# Patient Record
Sex: MALE | Race: OTHER
[De-identification: names, ages, dates, MRNs, and addresses within clinical notes are randomized per-mention and may not be internally consistent; named-entity substitution may affect disease eponyms.]

---

## 2019-08-18 ENCOUNTER — HOSPITAL ENCOUNTER (EMERGENCY)
Dept: HOSPITAL 56 - MW.ED | Age: 24
Discharge: HOME | End: 2019-08-18
Payer: SELF-PAY

## 2019-08-18 DIAGNOSIS — F17.210: ICD-10-CM

## 2019-08-18 DIAGNOSIS — Z02.89: Primary | ICD-10-CM

## 2019-08-18 NOTE — EDM.PDOC
ED HPI GENERAL MEDICAL PROBLEM





- General


Chief Complaint: General


Stated Complaint: MEDICAL CLEARANCE


Time Seen by Provider: 08/18/19 01:58





- History of Present Illness


INITIAL COMMENTS - FREE TEXT/NARRATIVE: 


HISTORY AND PHYSICAL:





History of present illness:


Patient is a 23-year-old male presents in custody of law enforcement for 

medical clearance patient has no complaints





Review of systems: 


As per history of present illness and below otherwise all systems reviewed and 

negative.





Past medical history: 


As per history of present illness and as reviewed below otherwise 

noncontributory.





Surgical history: 


As per history of present illness and as reviewed below otherwise 

noncontributory.





Social history: 


No reported history of drug or alcohol abuse.





Family history: 


As per history of present illness and as reviewed below otherwise 

noncontributory.





Physical exam:


HEENT: Atraumatic, normocephalic, pupils reactive, negative for conjunctival 

pallor or scleral icterus, mucous membranes moist, throat clear, neck supple, 

nontender, trachea midline.


Lungs: Clear to auscultation, breath sounds equal bilaterally, chest nontender.


Heart: S1S2, regular, negative for clicks, rubs, or JVD.


Abdomen: Soft, nondistended, nontender. Negative for masses or 

hepatosplenomegaly. Negative for costovertebral tenderness.


Pelvis: Stable nontender.


Genitourinary: Deferred.


Rectal: Deferred.


Extremities: Atraumatic, negative for cords or calf pain. Neurovascular 

unremarkable.


Neuro: Awake, alert, oriented. Cranial nerves II through XII unremarkable. 

Cerebellum unremarkable. Motor and sensory unremarkable throughout. Exam 

nonfocal.





Diagnostics:


None





Therapeutics:


None





Impression: 


#1 medical clearance for incarceration





Definitive disposition and diagnosis as appropriate pending reevaluation and 

review of above.








- Related Data


 Allergies











Allergy/AdvReac Type Severity Reaction Status Date / Time


 


No Known Allergies Allergy   Verified 08/18/19 01:51











Home Meds: 


 Home Meds





ALPRAZolam [Xanax] mg PO ASDIRECTED 08/18/19 [History]











Social & Family History





- Tobacco Use


Smoking Status *Q: Current Every Day Smoker


Years of Tobacco use: 2


Packs/Tins Daily: 1





- Recreational Drug Use


Recreational Drug Use: No





ED ROS GENERAL





- Review of Systems


Review Of Systems: ROS reveals no pertinent complaints other than HPI.





ED EXAM, GENERAL





- Physical Exam


Exam: See Below (See dictation)





Course





- Vital Signs


Last Recorded V/S: 





 Last Vital Signs











Temp  36.1 C   08/18/19 01:40


 


Pulse  130 H  08/18/19 01:40


 


Resp  18   08/18/19 01:40


 


BP  126/84   08/18/19 01:40


 


Pulse Ox  95   08/18/19 01:40














- Orders/Labs/Meds


Orders: 





 Active Orders 24 hr











 Category Date Time Status


 


 GLUCOSE,POC [POC] Routine Lab  08/18/19 01:57 Received














Departure





- Departure


Time of Disposition: 01:59


Disposition: Home, Self-Care 01


Condition: Good


Clinical Impression: 


 Medical clearance for incarceration








- Discharge Information


Referrals: 


PCP,None [Primary Care Provider] - 


Additional Instructions: 


The following information is given to patients seen in the emergency department 

who are being discharged to home. This information is to outline your options 

for follow-up care. We provide all patients seen in our emergency department 

with a follow-up referral.





The need for follow-up, as well as the timing and circumstances, are variable 

depending upon the specifics of your emergency department visit.





If you don't have a primary care physician on staff, we will provide you with a 

referral. We always advise you to contact your personal physician following an 

emergency department visit to inform them of the circumstance of the visit and 

for follow-up with them and/or the need for any referrals to a consulting 

specialist.





The emergency department will also refer you to a specialist when appropriate. 

This referral assures that you have the opportunity for followup care with a 

specialist. All of these measure are taken in an effort to provide you with 

optimal care, which includes your followup.





Under all circumstances we always encourage you to contact your private 

physician who remains a resource for coordinating  your care. When calling for 

followup care, please make the office aware that this follow-up is from your 

recent emergency room visit. If for any reason you are refused follow-up, 

please contact the [facility] emergency department at [phone] 

and asked to speak to the emergency department charge nurse.

















Follow-up primary medical doctor as needed as discussed return as needed as 

discussed

## 2019-12-11 ENCOUNTER — HOSPITAL ENCOUNTER (EMERGENCY)
Dept: HOSPITAL 56 - MW.ED | Age: 24
Discharge: HOME | End: 2019-12-11
Payer: SELF-PAY

## 2019-12-11 DIAGNOSIS — S09.90XA: Primary | ICD-10-CM
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
## 2019-12-11 DIAGNOSIS — R56.9: ICD-10-CM

## 2019-12-11 DIAGNOSIS — F19.10: ICD-10-CM
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
## 2019-12-11 LAB
BUN SERPL-MCNC: 16 MG/DL (ref 7–18)
CHLORIDE SERPL-SCNC: 103 MMOL/L (ref 98–107)
CO2 SERPL-SCNC: 24 MMOL/L (ref 21–32)
GLUCOSE SERPL-MCNC: 105 MG/DL (ref 74–106)
POTASSIUM SERPL-SCNC: 3.5 MMOL/L (ref 3.5–5.1)
SODIUM SERPL-SCNC: 138 MMOL/L (ref 136–148)

## 2019-12-11 NOTE — CT
INDICATION:



Trauma, LOC



TECHNIQUE:



CT head without contrast.



COMPARISON:



None 



FINDINGS:



CSF spaces: Within normal limits for age.  



Brain parenchyma: The gray-white differentiation is normal.  No sign of 

mass, hemorrhage, or midline shift.  



Skull base and calvarium: The visualized paranasal sinuses and mastoid air 

cells demonstrate no acute or significant findings.  The visualized orbits 

are grossly unremarkable.  No skull fractures. Midline posterior parietal 

scalp hematoma. 



IMPRESSION:



Midline posterior parietal scalp hematoma with no associated fractures or 

evidence of acute intracranial trauma.



Dictated by Derek Oliver MD @ 12/11/2019 9:34:27 PM



Please note that all CT scans at this facility use dose modulation, 

iterative reconstruction, and/or weight-based dosing when appropriate to 

reduce radiation dose to as low as reasonably achievable.



Dictated by: Derek Oliver MD @ 12/11/2019 21:34:33



(Electronically Signed)

## 2019-12-11 NOTE — EDM.PDOC
ED HPI GENERAL MEDICAL PROBLEM





- General


Source of Information: Reports: Patient


History Limitations: Reports: No Limitations


  ** back of head


Pain Score (Numeric/FACES): 7





<Ilana Madsen - Last Filed: 12/11/19 20:41>





<AmbreenKiran - Last Filed: 12/11/19 21:52>





- General


Chief Complaint: Neurological Problem


Stated Complaint: SEIZURES


Time Seen by Provider: 12/11/19 20:36





- History of Present Illness


INITIAL COMMENTS - FREE TEXT/NARRATIVE: 





HISTORY AND PHYSICAL:





History of present illness:


Patient is a 24-year-old male presents to the ED via EMS for seizure. Per EMS 

patient was checking out at [facility] when he had a seizure. Patient fell 

backwards hitting the back of his head and had 1-3 minutes of seizure-like 

activity. Patient denies history of seizures. He does take xanax as needed, 

states he hasn't taken any in a few days. He denies illicit drug use. Reports 

social alcohol use. 





Review of systems: 


As per history of present illness and below otherwise all systems reviewed and 

negative.





Past medical history: 


As per history of present illness and as reviewed below otherwise 

noncontributory.





Surgical history: 


As per history of present illness and as reviewed below otherwise 

noncontributory.





Social history: 


No reported history of drug or alcohol abuse.





Family history: 


As per history of present illness and as reviewed below otherwise 

noncontributory.





Physical exam:


General: Patient sitting comfortably in no acute distress and nontoxic appearing


HEENT: Atraumatic, normocephalic, pupils reactive, negative for conjunctival 

pallor or scleral icterus, mucous membranes moist, throat clear, neck supple, 

nontender, trachea midline. No meningeal signs. 


Lungs: Clear to auscultation, breath sounds equal bilaterally, chest nontender.


Heart: S1S2, regular, negative for clicks, rubs, or overt murmur.


Abdomen: Soft, nondistended, nontender. Negative for masses or 

hepatosplenomegaly. Negative for costovertebral tenderness. No rigidity, rebound

, guarding.


Pelvis: Stable nontender.


Genitourinary: Deferred.


Rectal: Deferred.


Extremities: Atraumatic, negative for cords or calf pain. Neurovascular 

unremarkable.


Neuro: Awake, alert, oriented. Cranial nerves II through XII unremarkable. 

Cerebellum unremarkable. Motor and sensory unremarkable throughout. Exam 

nonfocal.





Notes: 





Diagnostics:


CBC, CMP, UDS, EKG, Head CT 





Therapeutics:


[]





Prescriptions:








Impression: 


[]





Plan:


[]





Definitive disposition and diagnosis as appropriate pending reevaluation and 

review of above.


 (Ilana Madsen)





- Related Data


 Allergies











Allergy/AdvReac Type Severity Reaction Status Date / Time


 


No Known Allergies Allergy   Verified 12/11/19 20:29











Home Meds: 


 Home Meds





. [No Known Home Meds]  12/11/19 [History]











Past Medical History





- Past Health History


Medical/Surgical History: Denies Medical/Surgical History


Psychiatric History: Reports: Other (See Below)


Other Psychiatric History: mental condition (?)





<Ilana Madsen - Last Filed: 12/11/19 20:41>





Social & Family History





- Family History


Family Medical History: Noncontributory





- Tobacco Use


Smoking Status *Q: Never Smoker





- Recreational Drug Use


Recreational Drug Use: Yes


Drug Use in Last 12 Months: Yes


Recreational Drug Type: Reports: Benzodiazepines





<Ilana Madsen - Last Filed: 12/11/19 20:41>





ED ROS GENERAL





- Review of Systems


Review Of Systems: Comprehensive ROS is negative, except as noted in HPI.





<Ilana Madsen - Last Filed: 12/11/19 20:41>





- Physical Exam


Exam: See Below (see dictation)





<Ilana Madsen - Last Filed: 12/11/19 20:41>





Course





<Ilana Madsen - Last Filed: 12/11/19 20:41>





<Clayton Crook - Last Filed: 12/11/19 21:52>





- Vital Signs


Text/Narrative:: 


Emergency department course has been unremarkable he remains alert and oriented 

3 with nonfocal exam his workup here was negative for any intracranial 

hemorrhage or other process labs were unremarkable with the exception of 

touching it was positive for cocaine and marijuana I did discuss with patient 

subs abuse in the relationship to his presentation in all likelihood and the 

need for discontinuing any illicit drugs. He was offered ADmission observation 

declines will follow-up with primary medical doctor and/or clinic


 (Clayton Crook)


Last Recorded V/S: 





 Last Vital Signs











Temp  37.6 C   12/11/19 20:27


 


Pulse  107 H  12/11/19 20:27


 


Resp  18   12/11/19 20:27


 


BP  160/83 H  12/11/19 20:27


 


Pulse Ox  97   12/11/19 20:27














- Orders/Labs/Meds


Orders: 





 Active Orders 24 hr











 Category Date Time Status


 


 EKG Documentation Completion [RC] STAT Care  12/11/19 20:35 Active











Labs: 





 Laboratory Tests











  12/11/19 12/11/19 12/11/19 Range/Units





  20:49 20:49 20:49 


 


WBC  8.06    (4.0-11.0)  K/uL


 


RBC  4.58    (4.50-5.90)  M/uL


 


Hgb  14.5    (13.0-17.0)  g/dL


 


Hct  40.9    (38.0-50.0)  %


 


MCV  89.3    (80.0-98.0)  fL


 


MCH  31.7    (27.0-32.0)  pg


 


MCHC  35.5    (31.0-37.0)  g/dL


 


RDW Std Deviation  41.7    (28.0-62.0)  fl


 


RDW Coeff of Vlad  13    (11.0-15.0)  %


 


Plt Count  241    (150-400)  K/uL


 


MPV  10.50    (7.40-12.00)  fL


 


Neut % (Auto)  71.6    (48.0-80.0)  %


 


Lymph % (Auto)  20.0    (16.0-40.0)  %


 


Mono % (Auto)  8.1    (0.0-15.0)  %


 


Eos % (Auto)  0.1    (0.0-7.0)  %


 


Baso % (Auto)  0.2    (0.0-1.5)  %


 


Neut # (Auto)  5.8 H    (1.4-5.7)  K/uL


 


Lymph # (Auto)  1.6    (0.6-2.4)  K/uL


 


Mono # (Auto)  0.7    (0.0-0.8)  K/uL


 


Eos # (Auto)  0.0    (0.0-0.7)  K/uL


 


Baso # (Auto)  0.0    (0.0-0.1)  K/uL


 


Nucleated RBC %  0.0    /100WBC


 


Nucleated RBCs #  0    K/uL


 


ABG Carboxyhemoglobin   1.9   (0-15)  %


 


Sodium    138  (136-148)  mmol/L


 


Potassium    3.5  (3.5-5.1)  mmol/L


 


Chloride    103  ()  mmol/L


 


Carbon Dioxide    24.0  (21.0-32.0)  mmol/L


 


BUN    16  (7.0-18.0)  mg/dL


 


Creatinine    1.1  (0.8-1.3)  mg/dL


 


Est Cr Clr Drug Dosing    111.24  mL/min


 


Estimated GFR (MDRD)    > 60.0  ml/min


 


Glucose    105  ()  mg/dL


 


Calcium    9.0  (8.5-10.1)  mg/dL


 


Total Bilirubin    0.8  (0.2-1.0)  mg/dL


 


AST    23  (15-37)  IU/L


 


ALT    37  (14-63)  IU/L


 


Alkaline Phosphatase    76  ()  U/L


 


Total Protein    7.4  (6.4-8.2)  g/dL


 


Albumin    4.0  (3.4-5.0)  g/dL


 


Globulin    3.4  (2.6-4.0)  g/dL


 


Albumin/Globulin Ratio    1.2  (0.9-1.6)  


 


Urine Opiates Screen     (NEGATIVE)  


 


Ur Oxycodone Screen     (NEGATIVE)  


 


Urine Methadone Screen     (NEGATIVE)  


 


Ur Barbiturates Screen     (NEGATIVE)  


 


Ur Phencyclidine Scrn     (NEGATIVE)  


 


Ur Amphetamine Screen     (NEGATIVE)  


 


U Methamphetamines Scrn     (NEGATIVE)  


 


U Benzodiazepines Scrn     (NEGATIVE)  


 


U Cocaine Metab Screen     (NEGATIVE)  


 


U Marijuana (THC) Screen     (NEGATIVE)  














  12/11/19 Range/Units





  21:07 


 


WBC   (4.0-11.0)  K/uL


 


RBC   (4.50-5.90)  M/uL


 


Hgb   (13.0-17.0)  g/dL


 


Hct   (38.0-50.0)  %


 


MCV   (80.0-98.0)  fL


 


MCH   (27.0-32.0)  pg


 


MCHC   (31.0-37.0)  g/dL


 


RDW Std Deviation   (28.0-62.0)  fl


 


RDW Coeff of Vlad   (11.0-15.0)  %


 


Plt Count   (150-400)  K/uL


 


MPV   (7.40-12.00)  fL


 


Neut % (Auto)   (48.0-80.0)  %


 


Lymph % (Auto)   (16.0-40.0)  %


 


Mono % (Auto)   (0.0-15.0)  %


 


Eos % (Auto)   (0.0-7.0)  %


 


Baso % (Auto)   (0.0-1.5)  %


 


Neut # (Auto)   (1.4-5.7)  K/uL


 


Lymph # (Auto)   (0.6-2.4)  K/uL


 


Mono # (Auto)   (0.0-0.8)  K/uL


 


Eos # (Auto)   (0.0-0.7)  K/uL


 


Baso # (Auto)   (0.0-0.1)  K/uL


 


Nucleated RBC %   /100WBC


 


Nucleated RBCs #   K/uL


 


ABG Carboxyhemoglobin   (0-15)  %


 


Sodium   (136-148)  mmol/L


 


Potassium   (3.5-5.1)  mmol/L


 


Chloride   ()  mmol/L


 


Carbon Dioxide   (21.0-32.0)  mmol/L


 


BUN   (7.0-18.0)  mg/dL


 


Creatinine   (0.8-1.3)  mg/dL


 


Est Cr Clr Drug Dosing   mL/min


 


Estimated GFR (MDRD)   ml/min


 


Glucose   ()  mg/dL


 


Calcium   (8.5-10.1)  mg/dL


 


Total Bilirubin   (0.2-1.0)  mg/dL


 


AST   (15-37)  IU/L


 


ALT   (14-63)  IU/L


 


Alkaline Phosphatase   ()  U/L


 


Total Protein   (6.4-8.2)  g/dL


 


Albumin   (3.4-5.0)  g/dL


 


Globulin   (2.6-4.0)  g/dL


 


Albumin/Globulin Ratio   (0.9-1.6)  


 


Urine Opiates Screen  NEGATIVE  (NEGATIVE)  


 


Ur Oxycodone Screen  NEGATIVE  (NEGATIVE)  


 


Urine Methadone Screen  NEGATIVE  (NEGATIVE)  


 


Ur Barbiturates Screen  NEGATIVE  (NEGATIVE)  


 


Ur Phencyclidine Scrn  NEGATIVE  (NEGATIVE)  


 


Ur Amphetamine Screen  NEGATIVE  (NEGATIVE)  


 


U Methamphetamines Scrn  NEGATIVE  (NEGATIVE)  


 


U Benzodiazepines Scrn  NEGATIVE  (NEGATIVE)  


 


U Cocaine Metab Screen  POSITIVE  (NEGATIVE)  


 


U Marijuana (THC) Screen  POSITIVE  (NEGATIVE)  














Departure





<Ilana Madsen - Last Filed: 12/11/19 20:41>





- Departure


Time of Disposition: 21:51


Condition: Good





<Clayton Crook - Last Filed: 12/11/19 21:52>





- Departure


Disposition: Home, Self-Care 01


Clinical Impression: 


 Substance abuse, Head trauma, History of seizure








- Discharge Information


Referrals: 


PCP,None [Primary Care Provider] - 


Forms:  ED Department Discharge


Additional Instructions: 


The following information is given to patients seen in the emergency department 

who are being discharged to home. This information is to outline your options 

for follow-up care. We provide all patients seen in our emergency department 

with a follow-up referral.





The need for follow-up, as well as the timing and circumstances, are variable 

depending upon the specifics of your emergency department visit.





If you don't have a primary care physician on staff, we will provide you with a 

referral. We always advise you to contact your personal physician following an 

emergency department visit to inform them of the circumstance of the visit and 

for follow-up with them and/or the need for any referrals to a consulting 

specialist.





The emergency department will also refer you to a specialist when appropriate. 

This referral assures that you have the opportunity for followup care with a 

specialist. All of these measure are taken in an effort to provide you with 

optimal care, which includes your followup.





Under all circumstances we always encourage you to contact your private 

physician who remains a resource for coordinating  your care. When calling for 

followup care, please make the office aware that this follow-up is from your 

recent emergency room visit. If for any reason you are refused follow-up, 

please contact the [facility] emergency department at [phone] 

and asked to speak to the emergency department charge nurse.





[facility]


Primary Care


[address] [place] [id number]


Phone: [phone]


Fax: [phone]














Doppler using drugs follow-up primary medical doctor and/or clinic as discussed 

seizure precautions and return as needed as discussed





Sepsis Event Note





- Evaluation


Sepsis Screening Result: No Definite Risk





- Focused Exam


Date Exam was Performed: 12/11/19


Time Exam was Performed: 20:41





<Ilana Madsen - Last Filed: 12/11/19 20:41>





- Focused Exam


Date Exam was Performed: 12/11/19


Time Exam was Performed: 21:50





<Clayton Crook - Last Filed: 12/11/19 21:52>





- Focused Exam


Vital Signs: 





 Vital Signs











  Temp Temp Pulse Resp BP Pulse Ox


 


 12/11/19 20:27  37.4 C  37.6 C  107 H  18  160/83 H  97

## 2023-04-11 ENCOUNTER — HOSPITAL ENCOUNTER (EMERGENCY)
Dept: HOSPITAL 56 - MW.ED | Age: 28
Discharge: HOME | End: 2023-04-11
Payer: COMMERCIAL

## 2023-04-11 DIAGNOSIS — K59.00: Primary | ICD-10-CM

## 2023-04-11 LAB
BUN SERPL-MCNC: 9 MG/DL (ref 7–18)
CHLORIDE SERPL-SCNC: 103 MMOL/L (ref 98–107)
CO2 SERPL-SCNC: 24 MMOL/L (ref 21–32)
EGFRCR SERPLBLD CKD-EPI 2021: 125 ML/MIN (ref 60–?)
GLUCOSE SERPL-MCNC: 89 MG/DL (ref 74–106)
LIPASE SERPL-CCNC: 60 U/L (ref 73–393)
POTASSIUM SERPL-SCNC: 3.5 MMOL/L (ref 3.5–5.1)
SODIUM SERPL-SCNC: 137 MMOL/L (ref 136–148)

## 2023-04-11 PROCEDURE — 36415 COLL VENOUS BLD VENIPUNCTURE: CPT

## 2023-04-11 PROCEDURE — 83690 ASSAY OF LIPASE: CPT

## 2023-04-11 PROCEDURE — 85025 COMPLETE CBC W/AUTO DIFF WBC: CPT

## 2023-04-11 PROCEDURE — 74177 CT ABD & PELVIS W/CONTRAST: CPT

## 2023-04-11 PROCEDURE — 99284 EMERGENCY DEPT VISIT MOD MDM: CPT

## 2023-04-11 PROCEDURE — 96361 HYDRATE IV INFUSION ADD-ON: CPT

## 2023-04-11 PROCEDURE — 96374 THER/PROPH/DIAG INJ IV PUSH: CPT

## 2023-04-11 PROCEDURE — 81003 URINALYSIS AUTO W/O SCOPE: CPT

## 2023-04-11 PROCEDURE — 80053 COMPREHEN METABOLIC PANEL: CPT
